# Patient Record
Sex: FEMALE | Race: WHITE | NOT HISPANIC OR LATINO | URBAN - METROPOLITAN AREA
[De-identification: names, ages, dates, MRNs, and addresses within clinical notes are randomized per-mention and may not be internally consistent; named-entity substitution may affect disease eponyms.]

---

## 2021-07-10 ENCOUNTER — EMERGENCY (EMERGENCY)
Facility: HOSPITAL | Age: 71
LOS: 1 days | Discharge: HOME | End: 2021-07-10
Attending: EMERGENCY MEDICINE
Payer: MEDICARE

## 2021-07-10 VITALS
DIASTOLIC BLOOD PRESSURE: 79 MMHG | RESPIRATION RATE: 16 BRPM | HEART RATE: 85 BPM | SYSTOLIC BLOOD PRESSURE: 118 MMHG | WEIGHT: 145.95 LBS | OXYGEN SATURATION: 97 % | TEMPERATURE: 97 F | HEIGHT: 61 IN

## 2021-07-10 VITALS
SYSTOLIC BLOOD PRESSURE: 137 MMHG | RESPIRATION RATE: 16 BRPM | DIASTOLIC BLOOD PRESSURE: 81 MMHG | OXYGEN SATURATION: 100 % | TEMPERATURE: 98 F | HEART RATE: 87 BPM

## 2021-07-10 DIAGNOSIS — Z90.49 ACQUIRED ABSENCE OF OTHER SPECIFIED PARTS OF DIGESTIVE TRACT: Chronic | ICD-10-CM

## 2021-07-10 PROCEDURE — 70450 CT HEAD/BRAIN W/O DYE: CPT | Mod: 26

## 2021-07-10 PROCEDURE — 76377 3D RENDER W/INTRP POSTPROCES: CPT

## 2021-07-10 PROCEDURE — 72125 CT NECK SPINE W/O DYE: CPT | Mod: 26

## 2021-07-10 PROCEDURE — 82962 GLUCOSE BLOOD TEST: CPT

## 2021-07-10 PROCEDURE — 73610 X-RAY EXAM OF ANKLE: CPT

## 2021-07-10 PROCEDURE — G1004: CPT

## 2021-07-10 PROCEDURE — 73200 CT UPPER EXTREMITY W/O DYE: CPT | Mod: 26,RT,MG

## 2021-07-10 PROCEDURE — 70450 CT HEAD/BRAIN W/O DYE: CPT

## 2021-07-10 PROCEDURE — 76377 3D RENDER W/INTRP POSTPROCES: CPT | Mod: 26

## 2021-07-10 PROCEDURE — 99285 EMERGENCY DEPT VISIT HI MDM: CPT

## 2021-07-10 PROCEDURE — 73610 X-RAY EXAM OF ANKLE: CPT | Mod: 26,LT

## 2021-07-10 PROCEDURE — 72125 CT NECK SPINE W/O DYE: CPT

## 2021-07-10 PROCEDURE — 73200 CT UPPER EXTREMITY W/O DYE: CPT

## 2021-07-10 PROCEDURE — 99284 EMERGENCY DEPT VISIT MOD MDM: CPT

## 2021-07-10 RX ORDER — ACETAMINOPHEN 500 MG
650 TABLET ORAL ONCE
Refills: 0 | Status: COMPLETED | OUTPATIENT
Start: 2021-07-10 | End: 2021-07-10

## 2021-07-10 RX ADMIN — Medication 650 MILLIGRAM(S): at 06:39

## 2021-07-10 NOTE — ED PROVIDER NOTE - PHYSICAL EXAMINATION
GEN: Patient awake alert NAD.   HEENT: mild tenderness to back of skull, no ecchymosis, laceration, hematomas, normocephalic, EOMI, no scleral icterus, moist MM  CARDIAC: RRR, S1, S2, no murmur.   PULM: CTA B/L no wheeze, rhonchi, rales.   ABD: soft NT, ND, no rebound no guarding, no CVA tenderness.   MSK: Tenderness to R upper back/ superior scapula, L ankle posterior tenderness, negative anterior draw, no pain with inversion/eversion, Moving all extremities, no edema. 5/5 strength and full ROM in all extremities.     NEURO: A&Ox3, gait normal, no focal neurological deficits, CN 2-12 grossly intact  SKIN: warm, dry, no rash, no ecchymosis

## 2021-07-10 NOTE — ED PROVIDER NOTE - PROGRESS NOTE DETAILS
Pt feeling better, imaging showed no acute pathology and pt is ok with d/c. Pt feeling better, imaging showed no acute pathology and pt is ok with d/c. Discussed fall prevention with pt and son. MD Santiago:  patient clinically well.  CTs, Xrays unremarkable.  Cause of fall more likely mechanical, than primary syncope.  dc home, return precautions.

## 2021-07-10 NOTE — ED ADULT NURSE NOTE - PMH
Asthma    Bronchitis    DM (diabetes mellitus)    Dyslipidemia    Frozen shoulder  Lt shoulder  History of appendectomy    HTN (hypertension)

## 2021-07-10 NOTE — ED PROVIDER NOTE - NSFOLLOWUPINSTRUCTIONS_ED_ALL_ED_FT
Please follow-up with primary care within a week. Please return to ER if symptoms worsen if you have worsening headaches, nausea, or vomiting, or symptoms of concern.       Fall Prevention    WHAT YOU NEED TO KNOW:    Fall prevention includes ways to make your home and other areas safer. It also includes ways you can move more carefully to prevent a fall. Health conditions that cause changes in your blood pressure, vision, or muscle strength and coordination may increase your risk for falls. Medicines may also increase your risk for falls if they make you dizzy, weak, or sleepy.    DISCHARGE INSTRUCTIONS:    Call 911 or have someone else call if:   •You have fallen and are unconscious.      •You have fallen and cannot move part of your body.      Contact your healthcare provider if:   •You have fallen and have pain or a headache.      •You have questions or concerns about your condition or care.      Fall prevention tips:   •Stand or sit up slowly. This may help you keep your balance and prevent falls.      •Use assistive devices as directed. Your healthcare provider may suggest that you use a cane or walker to help you keep your balance. You may need to have grab bars put in your bathroom near the toilet or in the shower.      •Wear shoes that fit well and have soles that . Wear shoes both inside and outside. Use slippers with good . Do not wear shoes with high heels.      •Wear a personal alarm. This is a device that allows you to call 911 if you fall and need help. Ask your healthcare provider for more information.      •Stay active. Exercise can help strengthen your muscles and improve your balance. Your healthcare provider may recommend water aerobics or walking. He or she may also recommend physical therapy to improve your coordination. Never start an exercise program without talking to your healthcare provider first.  Walking for Exercise           •Manage your medical conditions.  Keep all appointments with your healthcare providers. Visit your eye doctor as directed.      Home safety tips:     Fall Prevention for Adults     •Add items to prevent falls in the bathroom. Put nonslip strips on your bath or shower floor to prevent you from slipping. Use a bath mat if you do not have carpet in the bathroom. This will prevent you from falling when you step out of the bath or shower. Use a shower seat so you do not need to stand while you shower. Sit on the toilet or a chair in your bathroom to dry yourself and put on clothing. This will prevent you from losing your balance from drying or dressing yourself while you are standing.      •Keep paths clear. Remove books, shoes, and other objects from walkways and stairs. Place cords for telephones and lamps out of the way so that you do not need to walk over them. Tape them down if you cannot move them. Remove small rugs. If you cannot remove a rug, secure it with double-sided tape. This will prevent you from tripping.      •Install bright lights in your home. Use night lights to help light paths to the bathroom or kitchen. Always turn on the light before you start walking.      •Keep items you use often on shelves within reach. Do not use a step stool to help you reach an item.      •Paint or place reflective tape on the edges of your stairs. This will help you see the stairs better.      Follow up with your healthcare provider as directed: Write down your questions so you remember to ask them during your visits.

## 2021-07-10 NOTE — ED PROVIDER NOTE - PATIENT PORTAL LINK FT
You can access the FollowMyHealth Patient Portal offered by [facility] by registering at the following website: [email or web address]. By joining [facility]’s FollowMyHealth portal, you will also be able to view your health information using other applications (apps) compatible with our system.

## 2021-07-10 NOTE — ED PROVIDER NOTE - CLINICAL SUMMARY MEDICAL DECISION MAKING FREE TEXT BOX
71 F with PMH of DMII, HTN presents to the ED after fall from standing height not on AC c/o R shoulder pain and L ankle pain.  Pt was going to the bathroom early this morning when she lost her balance and fell into bathtub striking the back her head, R scapula and L ankle. 71 F with PMH of DMII, HTN presents to the ED after fall from standing height not on AC c/o R shoulder pain and L ankle pain.  Pt was going to the bathroom early this morning when she lost her balance and fell into bathtub striking the back her head, R scapula and L ankle. Will obtain R shoulder x-ray and Ct to r/o DL, humoral fx, scapular fx, head CT to r/o ICH, L ankle x-rays to r/o fx.

## 2021-07-10 NOTE — ED PROVIDER NOTE - OBJECTIVE STATEMENT
71 F with PMH of DMII, HTN presents to the ED after fall from standing height not on AC c/o R shoulder pain and L ankle pain.  Pt was going to the bathroom early this morning when she lost her balance and fell into bathtub striking the back her head, R scapula and L ankle. As per pt son, pt had LOC for 3-4 minutes. Pt initially had nausea but it has subsided. Pt has had a recent hx of frequent falls as per son. Pt denies CP, lightheadedness, numbness, tingling, vomiting, ABD pain. 71 F with PMH of DMII, HTN presents to the ED after fall from standing height not on AC c/o R shoulder pain and L ankle pain.  Pt was going to the bathroom early this morning when she lost her balance and fell into bathtub striking the back her head, R scapula and L ankle. As per pt son, pt had LOC for 3-4 minutes. Pt initially had nausea but it has subsided. Pt has had a recent hx of frequent falls as per son. Pt denies CP, lightheadedness, numbness, tingling, vomiting, ABD pain. Pt lives with son.

## 2021-07-10 NOTE — ED ADULT NURSE NOTE - OBJECTIVE STATEMENT
Pt is a 72 y/o female pmhx of HTN & T2DM presents to the ED BIBA from home s/p fall. Pt says she got up to go to the bathroom, lost her balance and fell hitting the back of her head. Denies LOC & AC use but states she felt confused & nauseous after the fall. Was unable to get off the floor herself, endorsing R shoulder pain. States she had an endoscopy yesterday and did not eat at all since before procedure, . Presents in c-spine collar. Pt presents alert & oriented x 4, calm, able to follow commands, speech clear, PERRL 2mm. Breathing spontaneous & nonlabored. Abdomen soft & nondistended. Strength 5/5 x 4 extremities, normally ambulates without assist. Strong peripheral pulses noted b/l, no edema noted. Skin warm, clean, dry & intact. Denies chest pain, SOB, abdominal pain, back pain, v/d, fever, chills, changes in vision. Call bell within reach, bed in lowest position, side rails up, wheels locked. Pt is a 72 y/o female pmhx of HTN & T2DM presents to the ED BIBA from home s/p fall. Pt says she got up to go to the bathroom, lost her balance and fell hitting the back of her head. Denies LOC & AC use but states she felt confused & nauseous after the fall. Was unable to get off the floor herself, endorsing R shoulder pain. States she had an endoscopy yesterday and did not eat at all since before procedure, . Presents in c-spine collar. Pt presents alert & oriented x 4, calm, able to follow commands, speech clear, PERRL 2mm. Breathing spontaneous & nonlabored. Abdomen soft & nondistended. Strength 5/5 x 4 extremities, normally ambulates without assist. Strong peripheral pulses noted b/l, mild edema noted to b/l LE. Skin warm, clean, dry & intact. Denies chest pain, SOB, abdominal pain, back pain, v/d, fever, chills, changes in vision. Call bell within reach, bed in lowest position, side rails up, wheels locked. Pt is a 72 y/o female pmhx of HTN & T2DM presents to the ED BIBA from home s/p fall. Pt says she got up to go to the bathroom, lost her balance and fell hitting the back of her head. Denies LOC & AC use but states she felt confused & nauseous after the fall. Was unable to get off the floor herself, endorsing R shoulder pain & L ankle pain. States she had an endoscopy yesterday and has not eaten since before procedure, . Presents in c-spine collar. Pt presents alert & oriented x 4, calm, able to follow commands, speech clear, PERRL 2mm. Breathing spontaneous & nonlabored. Abdomen soft & nondistended. Strength 5/5 x 4 extremities, normally ambulates without assist. Strong peripheral pulses noted b/l, mild edema noted to b/l LE. Skin warm, clean, dry & intact. Denies chest pain, SOB, abdominal pain, back pain, v/d, fever, chills, changes in vision. Call bell within reach, bed in lowest position, side rails up, wheels locked.

## 2021-07-10 NOTE — ED PROVIDER NOTE - NS ED ROS FT
GENERAL: No fever, chills  EYES: no vision changes, no discharge.   ENT: no difficulty swallowing or speaking   CARDIAC: no chest pain/pressure, SOB, lower extremity swelling  PULMONARY: no cough, SOB  GI: +nausea, no abdominal pain, v/d  : no dysuria, no hematuria  SKIN: no rashes, no ecchymosis  NEURO: no headache, lightheadedness  MSK: L ankle pain pain, myalgia, weakness. GENERAL: No fever, chills  EYES: no vision changes, no discharge.   ENT: no difficulty swallowing or speaking   CARDIAC: no chest pain/pressure, SOB, lower extremity swelling  PULMONARY: no cough, SOB  GI: +nausea, no abdominal pain, v/d  : no dysuria, no hematuria  SKIN: no rashes, no ecchymosis  NEURO: no headache, lightheadedness  MSK: L ankle pain pain, myalgia, weakness.    A 14 point review of systems is negative except as in HPI or otherwise documented.

## 2021-07-10 NOTE — ED ADULT NURSE NOTE - NSIMPLEMENTINTERV_GEN_ALL_ED
Implemented All Fall Risk Interventions:  [place] to call system. Call bell, personal items and telephone within reach. Instruct patient to call for assistance. Room bathroom lighting operational. Non-slip footwear when patient is off stretcher. Physically safe environment: no spills, clutter or unnecessary equipment. Stretcher in lowest position, wheels locked, appropriate side rails in place. Provide visual cue, wrist band, yellow gown, etc. Monitor gait and stability. Monitor for mental status changes and reorient to person, place, and time. Review medications for side effects contributing to fall risk. Reinforce activity limits and safety measures with patient and family.

## 2021-07-10 NOTE — ED PROVIDER NOTE - CARE PLAN
Principal Discharge DX:	Fall   Principal Discharge DX:	Closed head injury with brief loss of consciousness  Secondary Diagnosis:	Fall, initial encounter  Secondary Diagnosis:	Acute pain of right shoulder

## 2021-07-10 NOTE — ED PROVIDER NOTE - ATTENDING CONTRIBUTION TO CARE
Patient s/p fall secondary to loss of balance while going to the bathroom in the morning.  Struck head with brief LOC.  Also complaining of pain in R shoulder/scapula region and L ankle.  Not on blood thinners.  On exam neuro intact, point tender along R scapula with normal ROM, no skull hematoma/lacerations, no midline tenderness, no chest/abdomen/pelvis tenderness and stable pelvis, no deformity of L ankle.  Will obtain screening imaging for occult injury given age and re-evaluate.

## 2022-06-06 ENCOUNTER — EMERGENCY (EMERGENCY)
Facility: HOSPITAL | Age: 72
LOS: 1 days | Discharge: HOME | End: 2022-06-06
Attending: EMERGENCY MEDICINE | Admitting: EMERGENCY MEDICINE
Payer: MEDICARE

## 2022-06-06 VITALS
HEART RATE: 73 BPM | TEMPERATURE: 98 F | RESPIRATION RATE: 18 BRPM | DIASTOLIC BLOOD PRESSURE: 87 MMHG | OXYGEN SATURATION: 100 % | SYSTOLIC BLOOD PRESSURE: 188 MMHG | HEIGHT: 61 IN

## 2022-06-06 VITALS
HEART RATE: 89 BPM | TEMPERATURE: 98 F | RESPIRATION RATE: 18 BRPM | OXYGEN SATURATION: 100 % | DIASTOLIC BLOOD PRESSURE: 94 MMHG | SYSTOLIC BLOOD PRESSURE: 162 MMHG

## 2022-06-06 DIAGNOSIS — Z90.49 ACQUIRED ABSENCE OF OTHER SPECIFIED PARTS OF DIGESTIVE TRACT: Chronic | ICD-10-CM

## 2022-06-06 LAB
ALBUMIN SERPL ELPH-MCNC: 2.4 G/DL — LOW (ref 3.3–5)
ALP SERPL-CCNC: 66 U/L — SIGNIFICANT CHANGE UP (ref 40–120)
ALT FLD-CCNC: 13 U/L — SIGNIFICANT CHANGE UP (ref 4–33)
ANION GAP SERPL CALC-SCNC: 9 MMOL/L — SIGNIFICANT CHANGE UP (ref 7–14)
APPEARANCE UR: CLEAR — SIGNIFICANT CHANGE UP
AST SERPL-CCNC: 26 U/L — SIGNIFICANT CHANGE UP (ref 4–32)
BACTERIA # UR AUTO: ABNORMAL
BASOPHILS # BLD AUTO: 0.02 K/UL — SIGNIFICANT CHANGE UP (ref 0–0.2)
BASOPHILS NFR BLD AUTO: 0.4 % — SIGNIFICANT CHANGE UP (ref 0–2)
BILIRUB SERPL-MCNC: <0.2 MG/DL — SIGNIFICANT CHANGE UP (ref 0.2–1.2)
BILIRUB UR-MCNC: NEGATIVE — SIGNIFICANT CHANGE UP
BUN SERPL-MCNC: 25 MG/DL — HIGH (ref 7–23)
CALCIUM SERPL-MCNC: 8.9 MG/DL — SIGNIFICANT CHANGE UP (ref 8.4–10.5)
CHLORIDE SERPL-SCNC: 102 MMOL/L — SIGNIFICANT CHANGE UP (ref 98–107)
CO2 SERPL-SCNC: 22 MMOL/L — SIGNIFICANT CHANGE UP (ref 22–31)
COLOR SPEC: COLORLESS — SIGNIFICANT CHANGE UP
CREAT SERPL-MCNC: 1.85 MG/DL — HIGH (ref 0.5–1.3)
DIFF PNL FLD: ABNORMAL
EGFR: 28 ML/MIN/1.73M2 — LOW
EOSINOPHIL # BLD AUTO: 0.09 K/UL — SIGNIFICANT CHANGE UP (ref 0–0.5)
EOSINOPHIL NFR BLD AUTO: 1.9 % — SIGNIFICANT CHANGE UP (ref 0–6)
EPI CELLS # UR: 7 /HPF — HIGH (ref 0–5)
FLUAV AG NPH QL: SIGNIFICANT CHANGE UP
FLUBV AG NPH QL: SIGNIFICANT CHANGE UP
GLUCOSE SERPL-MCNC: 126 MG/DL — HIGH (ref 70–99)
GLUCOSE UR QL: NEGATIVE — SIGNIFICANT CHANGE UP
HCT VFR BLD CALC: 30.2 % — LOW (ref 34.5–45)
HGB BLD-MCNC: 10.2 G/DL — LOW (ref 11.5–15.5)
HYALINE CASTS # UR AUTO: 2 /LPF — SIGNIFICANT CHANGE UP (ref 0–7)
IANC: 2.63 K/UL — SIGNIFICANT CHANGE UP (ref 1.8–7.4)
IMM GRANULOCYTES NFR BLD AUTO: 1.1 % — SIGNIFICANT CHANGE UP (ref 0–1.5)
KETONES UR-MCNC: NEGATIVE — SIGNIFICANT CHANGE UP
LEUKOCYTE ESTERASE UR-ACNC: ABNORMAL
LYMPHOCYTES # BLD AUTO: 1.25 K/UL — SIGNIFICANT CHANGE UP (ref 1–3.3)
LYMPHOCYTES # BLD AUTO: 26.9 % — SIGNIFICANT CHANGE UP (ref 13–44)
MCHC RBC-ENTMCNC: 29.9 PG — SIGNIFICANT CHANGE UP (ref 27–34)
MCHC RBC-ENTMCNC: 33.8 GM/DL — SIGNIFICANT CHANGE UP (ref 32–36)
MCV RBC AUTO: 88.6 FL — SIGNIFICANT CHANGE UP (ref 80–100)
MONOCYTES # BLD AUTO: 0.61 K/UL — SIGNIFICANT CHANGE UP (ref 0–0.9)
MONOCYTES NFR BLD AUTO: 13.1 % — SIGNIFICANT CHANGE UP (ref 2–14)
NEUTROPHILS # BLD AUTO: 2.63 K/UL — SIGNIFICANT CHANGE UP (ref 1.8–7.4)
NEUTROPHILS NFR BLD AUTO: 56.6 % — SIGNIFICANT CHANGE UP (ref 43–77)
NITRITE UR-MCNC: NEGATIVE — SIGNIFICANT CHANGE UP
NRBC # BLD: 0 /100 WBCS — SIGNIFICANT CHANGE UP
NRBC # FLD: 0 K/UL — SIGNIFICANT CHANGE UP
NT-PROBNP SERPL-SCNC: 306 PG/ML — HIGH
PH UR: 7 — SIGNIFICANT CHANGE UP (ref 5–8)
PLATELET # BLD AUTO: 245 K/UL — SIGNIFICANT CHANGE UP (ref 150–400)
POTASSIUM SERPL-MCNC: 3.8 MMOL/L — SIGNIFICANT CHANGE UP (ref 3.5–5.3)
POTASSIUM SERPL-SCNC: 3.8 MMOL/L — SIGNIFICANT CHANGE UP (ref 3.5–5.3)
PROT SERPL-MCNC: 5.2 G/DL — LOW (ref 6–8.3)
PROT UR-MCNC: ABNORMAL
RBC # BLD: 3.41 M/UL — LOW (ref 3.8–5.2)
RBC # FLD: 12.8 % — SIGNIFICANT CHANGE UP (ref 10.3–14.5)
RBC CASTS # UR COMP ASSIST: SIGNIFICANT CHANGE UP /HPF (ref 0–4)
RSV RNA NPH QL NAA+NON-PROBE: SIGNIFICANT CHANGE UP
SARS-COV-2 RNA SPEC QL NAA+PROBE: SIGNIFICANT CHANGE UP
SODIUM SERPL-SCNC: 133 MMOL/L — LOW (ref 135–145)
SP GR SPEC: 1.01 — SIGNIFICANT CHANGE UP (ref 1–1.05)
TROPONIN T, HIGH SENSITIVITY RESULT: 32 NG/L — SIGNIFICANT CHANGE UP
TROPONIN T, HIGH SENSITIVITY RESULT: 33 NG/L — SIGNIFICANT CHANGE UP
UROBILINOGEN FLD QL: SIGNIFICANT CHANGE UP
WBC # BLD: 4.65 K/UL — SIGNIFICANT CHANGE UP (ref 3.8–10.5)
WBC # FLD AUTO: 4.65 K/UL — SIGNIFICANT CHANGE UP (ref 3.8–10.5)
WBC UR QL: SIGNIFICANT CHANGE UP /HPF (ref 0–5)

## 2022-06-06 PROCEDURE — 99236 HOSP IP/OBS SAME DATE HI 85: CPT | Mod: 25

## 2022-06-06 PROCEDURE — 93010 ELECTROCARDIOGRAM REPORT: CPT

## 2022-06-06 PROCEDURE — 71046 X-RAY EXAM CHEST 2 VIEWS: CPT | Mod: 26

## 2022-06-06 PROCEDURE — 99285 EMERGENCY DEPT VISIT HI MDM: CPT

## 2022-06-06 RX ORDER — SODIUM CHLORIDE 9 MG/ML
1000 INJECTION, SOLUTION INTRAVENOUS
Refills: 0 | Status: DISCONTINUED | OUTPATIENT
Start: 2022-06-06 | End: 2022-06-06

## 2022-06-06 RX ORDER — ISOPROPYL ALCOHOL, BENZOCAINE .7; .06 ML/ML; ML/ML
1 SWAB TOPICAL
Qty: 1 | Refills: 5
Start: 2022-06-06

## 2022-06-06 RX ADMIN — SODIUM CHLORIDE 100 MILLILITER(S): 9 INJECTION, SOLUTION INTRAVENOUS at 04:12

## 2022-06-06 NOTE — ED PROVIDER NOTE - PHYSICAL EXAMINATION
PHYSICAL EXAM:  GENERAL: Sitting comfortable in bed, in no acute distress  HENMT: Atraumatic, moist mucous membranes  EYES: Clear bilaterally, PERRL, EOMs intact b/l  HEART: RRR, nl S1/S2, no murmurs  RESPIRATORY: Clear to auscultation bilaterally, no wheezes/rhonchi/rales  ABDOMEN: +BS, soft, nontender, nondistended  EXTREMITIES: +1 pitting edema b/l, +2 radial pulses b/l  NEURO:  A&Ox4, no focal motor deficits or sensory deficits  SKIN:  Skin warm, dry and intact. No evidence of rash.

## 2022-06-06 NOTE — ED CDU PROVIDER DISPOSITION NOTE - CLINICAL COURSE
73 Y/O F PMH HTN, DM on Glimepiride 4, Metformin 500BID and Tradjenta 5Mg presents with fatigue. Pt states that at 10PM she felt lightheaded and sweaty and called EMS, FS 53 on EMS arrival. Pt endorses going on a long walk after 7PM. Does states she has been eating less in general after she states she suffered from covid and also states she suffered covid-19 vaccine side effects. Pt was seen in CDU, metformin stopped due to CKD, Glimeperide stopped as A1C is below 6 and pt is becoming hypoglycemic. Pt states she feels better, requests D/C. Will D/C with bg monitoring equipment, Endocrinology, PCP and Nephrology follow up. Pt states she is established with Dr. Delong.

## 2022-06-06 NOTE — ED ADULT NURSE NOTE - OBJECTIVE STATEMENT
Patient A&Ox4 ambulatory coming via EMS from home for dizziness and weakness today. Patient states FS was low when EMS checked it. Hx DM2 on oral medications only. Respirations even and unlabored. Patient states improvement after given apple juice in triage. Patient also endorsing new swelling to b/l ankles. Denies cp, sob, n/v/d, fevers and chills. Stretcher in lowest position, wheels locked, appropriate side rails in place, call bell in reach.

## 2022-06-06 NOTE — CONSULT NOTE ADULT - ASSESSMENT
or72 Y/O F PMH HTN, DM on Glimepiride 4, Metformin 500BID and Tradjenta 5Mg (per CDU note, pt does not know her meds off hand) presents with fatigue. Pt states that at 10PM she felt lightheaded and sweaty and called EMS, FS 53 on EMS arrival.  endocrine called for further assistance   a1c 5.8    1. DM  Please confirm DM meds with the pts pharmacy (as pt is unable to inform me of  her meds) however per CDU notes appears pt is on Glimepiride 4, Metformin 500BID and Tradjenta 5Mg   in the setting of decreased appetite and weight loss and a1c 5.8, which shows tightly controlled DM, would recommend that glimepiride be held for dc   would further recommend that metformin be held given decreased renal function and increased risk of lactic acidosis   continue trajenta at dc   i dw pt she needs to check FBSG at home given mediation changes and if the FSBG are to rise >200s consistently she will need to call her MD and start another DM agent   her a1c is 5.8, well controlled (do not have a prior to compare it too, nevertheless would avoid very tight control in the setting of known hypoglycemia currently)  pt is monitoring her diet more closely now and has decreased sugars and carbs intake and therefore will have less need for DM meds if diet is better controlled   please inform PCP of this change in meds/medical update and need for f/u outpt    monitor the pt and encourage PO intake (glucose was 70s this am).   FSBG premeals and bedtime and 3 am   given decreased kidney function she can be at risk for continued hypoglycemia in next 48-72hrs from the last dose of glimepiride   once glucoses are stable please do inform the pt to monitor FSBG premeals and bedtime and 3am for next two days if dc home to monitor for hypoglycemia      To prepare for dc:  - At home, Patient should check FSBG premeals and bedtime. Pt should call their doctor when FSBG <70 or above >400 and or consistently above 200s as changes in the regimen will have to be made.  -pt should call PMD for DM related questions or concerns  -Recommend annual podiatry and ophthalmology follow up.   Patient will also need a glucometer, test strips, lancets, alcohol pads, please review DM education and goals with the pt prior to dc and ensure pt has DM testing supplies     --------------------------------------------------------------------------------------------------------------------------------------      2. HTN  goal BP in DM <130/80  management as per the primary team  should establish outpt renal followup as well given what appears to be CKD?      3. HLD   outpt lipid panel  unclear if the pt is on statin as she does not know her current meds          Roxane Anderson MD  Attending Physician   Department of Endocrinology, Diabetes and Metabolism     Pager  [phone] [please provide 10 digit call back number]  SARAH 9-5  [email or web address]  Nights and weekends: [phone]  Please note that this patient may be followed by a different provider tomorrow.   If no answer or after hours, please contact [phone].  For final dc reccomendations, please call [phone] or page the endocrine fellow on call.

## 2022-06-06 NOTE — ED CDU PROVIDER INITIAL DAY NOTE - PROGRESS NOTE DETAILS
Bg has normalized, pt states she feels well and would like to go home now, will have the pt stop metformin and stop Glimepiride. Pt advised to F/U with her Nephrologist Dr. Delong.

## 2022-06-06 NOTE — ED ADULT NURSE NOTE - EXTENSIONS OF SELF_ADULT
None I have reviewed and confirmed nurses' notes for patient's medications, allergies, medical history, and surgical history.

## 2022-06-06 NOTE — ED PROVIDER NOTE - OBJECTIVE STATEMENT
71 y/o F PMH HTN, DM on oral meds BIBEMS for lightheadedness, dizziness and hypoglycemia. Pt reports around 10pm she began feeling lightheaded, dizzy with diaphoresis, she laid down to try to resolve these symptoms however did not improve thus family called EMS. On EMS arrival pt FS 53, given oral glucose and repeat was 70. She states lightheadedness improved but not completely after being given glucose, also notes she was feeling "off" earlier in the day after going on a long walk. Had eaten dinner at 7pm, no decreased PO intake recently. Also reports b/l LE swelling which she noticed today. She denies any fevers/chills, HA, neck pain, CP, SOB, cough, abd pain, n/v/d, dysuria/hematuria, focal numbness/weakness, or rashes. Pt notes she takes metformin, glimepiride and "another pill" for diabetes but unsure of the name, and that her PMD told her that her kidney function was decreased at her appointment last week.

## 2022-06-06 NOTE — ED CDU PROVIDER DISPOSITION NOTE - ATTENDING CONTRIBUTION TO CARE
agree with PA clinical course note  "71 Y/O F PMH HTN, DM on Glimepiride 4, Metformin 500BID and Tradjenta 5Mg presents with fatigue. Pt states that at 10PM she felt lightheaded and sweaty and called EMS, FS 53 on EMS arrival. Pt endorses going on a long walk after 7PM. Does states she has been eating less in general after she states she suffered from covid and also states she suffered covid-19 vaccine side effects. Pt was seen in CDU, metformin stopped due to CKD, Glimeperide stopped as A1C is below 6 and pt is becoming hypoglycemic. Pt states she feels better, requests D/C. Will D/C with bg monitoring equipment, Endocrinology, PCP and Nephrology follow up. Pt states she is established with Dr. Delong."    pt endorses multiple episodes of low sugar levels.  Has been seen by endocrinology whose recommendations are to stop the sulfonylurea.    pt has no complaints at this time and can follow up with PCP and endocrinology as well as nephrology.  Stable for discharge

## 2022-06-06 NOTE — ED CDU PROVIDER INITIAL DAY NOTE - MEDICAL DECISION MAKING DETAILS
73 Y/O F PMH HTN, DM on Glimepiride 4, Metformin 500BID and Tradjenta 5Mg presents with fatigue. Pt states that at 10PM she felt lightheaded and sweaty and called EMS, FS 53 on EMS arrival. Pt endorses going on a long walk after 7PM. Does states she has been eating less in general after she states she suffered from covid. Endocrinology consulted, plan to discuss Cr with pt's Nephrologist or PCP in the AM, Q4 hour finger sticks and IV hydration with dextrose ordered. Pt is well appearing, EKG is normal, denies any other sx/acute complaints.

## 2022-06-06 NOTE — ED PROVIDER NOTE - ATTENDING CONTRIBUTION TO CARE
Seen and examined, states hx of DM, takes oral hypoglycemic, never has had low sugars, today was feeling weak, went for a walk w no relief, felt worse, ate dinner at usual time (typically eats 2 meals per day, bkfast and dinner) and then several hrs. later felt worse, called family to take to ED. EMS found w low FS and pt. was given juice. States was feeling better by the time she came to ED, still mild weakness. Was told by PMD that kidney function had diminished recently. No recent illness, no fever/chills, no N/V/D, no urinary sx. Awake/alert at time of exam, clear lungs, heart reg, abd soft, NT to palp, no CVAT ,no edema, NT calves.

## 2022-06-06 NOTE — ED CDU PROVIDER INITIAL DAY NOTE - NS ED ATTENDING STATEMENT MOD
This was a shared visit with the CHEY. I reviewed and verified the documentation and independently performed the documented:

## 2022-06-06 NOTE — ED PROVIDER NOTE - NS ED ROS FT
Constitutional: no fever and no chills, (+) hypoglycemia, (+) lightheadedness, (+) diaphoresis  Eyes: no pain, no visual changes  ENMT: no ear pain, no dysphagia or throat pain  Neck: no pain, no stiffness  CV: no chest pain, no palpitations, (+) edema  Resp: no cough, no shortness of breath  Abd: no abdominal pain, no nausea or vomiting, no diarrhea  : no dysuria, no hematuria  MSK: no back pain, no neck pain, no joint pain  Neuro: no LOC, no gait abnormality, no headache, no sensory deficits, no weakness  Skin: no rashes, no lacerations

## 2022-06-06 NOTE — ED CDU PROVIDER INITIAL DAY NOTE - NSICDXPASTMEDICALHX_GEN_ALL_CORE_FT
PAST MEDICAL HISTORY:  Asthma     Bronchitis     DM (diabetes mellitus)     Dyslipidemia     Frozen shoulder Lt shoulder    History of appendectomy     HTN (hypertension)

## 2022-06-06 NOTE — ED PROVIDER NOTE - CLINICAL SUMMARY MEDICAL DECISION MAKING FREE TEXT BOX
73 y/o F PMH HTN, DM on oral meds BIBEMS for lightheadedness, dizziness and hypoglycemia. FS 53, given oral glucose and repeat was 70. On arrival in ED FS 54, given apple juice. No recent infectious symptoms, chest pain or SOB. LE swelling noticed today. Normal PO intake. Does note decreased kidney function at PMD last week. Hypoglycemia likely 2/2 sulfonylurea use in setting of decreased kidney function, less likely infectious or ischemic process. Will obtain CBC, BMP, trop/bnp, UA, CXR, EKG, repeat finger stick, reassess.

## 2022-06-06 NOTE — ED PROVIDER NOTE - PROGRESS NOTE DETAILS
[facility] EM/IM PGY4: Repeat FS 64, will place in CDU for continued blood glucose monitoring [facility] EM/IM PGY4: Repeat FS 64, will place in CDU for continued blood glucose monitoring. Asymptomatic with FS of 64.

## 2022-06-06 NOTE — ED ADULT TRIAGE NOTE - CHIEF COMPLAINT QUOTE
Pt BIBEMS for hypoglycemia. States she felt weak earlier today. FS 53 in field, given oral glucose, repeat fs 70 by ems. FS currently 54, given apple juice. Currently A&Ox4. Pmhx: DM, htn.

## 2022-06-06 NOTE — ED CDU PROVIDER INITIAL DAY NOTE - OBJECTIVE STATEMENT
73 Y/O F PMH HTN, DM on Glimepiride 4, Metformin 500BID and Tradjenta 5Mg presents with fatigue. Pt states that at 10PM she felt lightheaded and sweaty and called EMS, FS 53 on EMS arrival. Pt endorses going on a long walk after 7PM. Does states she has been eating less in general after she states she suffered from covid and also states she suffered covid-19 vaccine side effects. Pt states she follows with a Nephrologist (Dr Delong). Pt was advised of her elevated BP, states she was on a medication for HTN but does not recall the name and states it was recently stopped. Pt denies any other sx or acute complaints.

## 2022-06-06 NOTE — ED CDU PROVIDER DISPOSITION NOTE - NSFOLLOWUPINSTRUCTIONS_ED_ALL_ED_FT
STOP Metformin and STOP Glimepiride.  Continue Tradjenta and Atorvastatin. Your A1C is below 6 and you have poor kidney function and were having hypoglycemia (low blood sugar). Follow up with your Nephrologist Dr. Delong as soon as possible and see your Primary Care Doctor as well as an Endocrinologist. The ER will contact you to assist with Endocrinology follow up. You were sent medication to check your blood sugar. RETURN TO THE ER FOR LIGHTHEADEDNESS, NAUSEA, BLOOD SUGAR BELOW 80 OR ABOVE 250 OR FOR ANY OTHER SYMPTOMS THAT CONCERN YOU. Continue all previously prescribed medications as directed.  Follow up with your primary care physician in 48-72 hours- bring copies of your results.  Return to the ER for worsening or persistent symptoms, and/or ANY NEW OR CONCERNING SYMPTOMS. If you have issues obtaining follow up, please call: [phone] to obtain a doctor or specialist who takes your insurance in your area.  You may call [phone] to make an appointment with the internal medicine clinic.

## 2022-06-06 NOTE — ED CDU PROVIDER DISPOSITION NOTE - PATIENT PORTAL LINK FT
You can access the FollowMyHealth Patient Portal offered by [facility] by registering at the following website: [email or web address]. By joining [employer]’s FollowMyHealth portal, you will also be able to view your health information using other applications (apps) compatible with our system.

## 2022-06-06 NOTE — CONSULT NOTE ADULT - SUBJECTIVE AND OBJECTIVE BOX
Reason For Consult: hypoglycemia     HPI:      PAST MEDICAL & SURGICAL HISTORY:  Asthma      DM (diabetes mellitus)      Dyslipidemia      HTN (hypertension)      Bronchitis      Frozen shoulder  Lt shoulder      History of appendectomy      History of appendectomy          FAMILY HISTORY:  No pertinent family history in first degree relatives        Social History:    Outpatient Medications:    MEDICATIONS  (STANDING):  dextrose 5% + sodium chloride 0.45%. 1000 milliLiter(s) (100 mL/Hr) IV Continuous <Continuous>    MEDICATIONS  (PRN):      Allergies    No Known Allergies    Intolerances      Review of Systems:  Constitutional: No fever  Eyes: No blurry vision  Neuro: No tremors  HEENT: No pain  Cardiovascular: No chest pain, palpitations  Respiratory: No SOB, no cough  GI: No nausea, vomiting, abdominal pain  : No dysuria  Skin: no rash  Psych: no depression  Endocrine: no polyuria, polydipsia  Hem/lymph: no swelling  Osteoporosis: no fractures    ALL OTHER SYSTEMS REVIEWED AND NEGATIVE    UNABLE TO OBTAIN    PHYSICAL EXAM:  VITALS: T(C): 36.6 (06-06-22 @ 10:28)  T(F): 97.9 (06-06-22 @ 10:28), Max: 98.2 (06-06-22 @ 00:39)  HR: 79 (06-06-22 @ 10:28) (71 - 82)  BP: 139/75 (06-06-22 @ 10:28) (139/75 - 188/87)  RR:  (16 - 18)  SpO2:  (99% - 100%)  Wt(kg): --  GENERAL: NAD, well-groomed, well-developed  EYES: No proptosis, no lid lag, anicteric  HEENT:  Atraumatic, Normocephalic, moist mucous membranes  THYROID: Normal size, no palpable nodules  RESPIRATORY: Clear to auscultation bilaterally; No rales, rhonchi, wheezing, or rubs  CARDIOVASCULAR: Regular rate and rhythm; No murmurs; no peripheral edema  GI: Soft, nontender, non distended, normal bowel sounds  SKIN: Dry, intact, No rashes or lesions  MUSCULOSKELETAL: Full range of motion, normal strength  NEURO: sensation intact, extraocular movements intact, no tremor, normal reflexes  PSYCH: Alert and oriented x 3, normal affect, normal mood  CUSHING'S SIGNS: no striae    POCT Blood Glucose.: 93 mg/dL (06-06-22 @ 11:20)  POCT Blood Glucose.: 71 mg/dL (06-06-22 @ 07:32)  POCT Blood Glucose.: 96 mg/dL (06-06-22 @ 05:05)  POCT Blood Glucose.: 64 mg/dL (06-06-22 @ 03:34)  POCT Blood Glucose.: 105 mg/dL (06-06-22 @ 01:22)  POCT Blood Glucose.: 81 mg/dL (06-06-22 @ 00:54)  POCT Blood Glucose.: 54 mg/dL (06-06-22 @ 00:41)                            10.2   4.65  )-----------( 245      ( 06 Jun 2022 01:20 )             30.2       06-06    133<L>  |  102  |  25<H>  ----------------------------<  126<H>  3.8   |  22  |  1.85<H>    eGFR: 28<L>    Ca    8.9      06-06    TPro  5.2<L>  /  Alb  2.4<L>  /  TBili  <0.2  /  DBili  x   /  AST  26  /  ALT  13  /  AlkPhos  66  06-06      Thyroid Function Tests:              Radiology:                  Reason For Consult: hypoglycemia     HPI:  71 Y/O F PMH HTN, DM on Glimepiride 4, Metformin 500BID and Tradjenta 5Mg (per CDU note, pt does not know her meds off hand) presents with fatigue. Pt states that at 10PM she felt lightheaded and sweaty and called EMS, FS 53 on EMS arrival.    endocrine called for further assistance   pt has a long standing hx of DM  she follows with her PCP whom she saw last week  she states that since she contracted COVID earlier this year she has had decreased appetite, lost weight.  she states she does check her FSBG at home, she does not know the normal range for normal blood glucose.  she does not report a family hx of DM    she states she has been monitoring her carb intake and has cut down sugary foods.  her a1c here is 5.1, pt states in the past DM was not well controlled  labs also notable for elevated Cr.      PAST MEDICAL & SURGICAL HISTORY:  Asthma      DM (diabetes mellitus)      Dyslipidemia      HTN (hypertension)      Bronchitis      Frozen shoulder  Lt shoulder      History of appendectomy      History of appendectomy          FAMILY HISTORY:  No pertinent family history in first degree relatives        Social History:  lives with family   no illicits     Outpatient Medications:  Glimepiride 4, Metformin 500BID and Tradjenta 5Mg (per CDU note, pt does not know her meds off hand)     MEDICATIONS  (STANDING):  dextrose 5% + sodium chloride 0.45%. 1000 milliLiter(s) (100 mL/Hr) IV Continuous <Continuous>    MEDICATIONS  (PRN):      Allergies    No Known Allergies    Intolerances      Review of Systems:  Constitutional: No fever  Eyes: No blurry vision  Neuro: No tremors  HEENT: No pain  Cardiovascular: No chest pain, palpitations  Respiratory: No SOB, no cough  GI: No nausea, vomiting, abdominal pain  : No dysuria  Skin: no rash  Psych: no depression  Endocrine: no polyuria, polydipsia  Hem/lymph: no swelling  Osteoporosis: no fractures    ALL OTHER SYSTEMS REVIEWED AND NEGATIVE        PHYSICAL EXAM:  VITALS: T(C): 36.6 (06-06-22 @ 10:28)  T(F): 97.9 (06-06-22 @ 10:28), Max: 98.2 (06-06-22 @ 00:39)  HR: 79 (06-06-22 @ 10:28) (71 - 82)  BP: 139/75 (06-06-22 @ 10:28) (139/75 - 188/87)  RR:  (16 - 18)  SpO2:  (99% - 100%)  Wt(kg): --  GENERAL: NAD, well-groomed, well-developed  EYES: No proptosis, no lid lag, anicteric  HEENT:  Atraumatic, Normocephalic, moist mucous membranes  RESPIRATORY: Clear to auscultation bilaterally; No rales, rhonchi, wheezing, or rubs  CARDIOVASCULAR: Regular rate and rhythm; No murmurs; no peripheral edema  GI: Soft, nontender, non distended, normal bowel sounds  SKIN: Dry, intact, No rashes or lesions  MUSCULOSKELETAL: Full range of motion, normal strength  NEURO: sensation intact, extraocular movements intact, no tremor, normal reflexes  PSYCH: Alert and oriented x 3, normal affect, normal mood      POCT Blood Glucose.: 93 mg/dL (06-06-22 @ 11:20)  POCT Blood Glucose.: 71 mg/dL (06-06-22 @ 07:32)  POCT Blood Glucose.: 96 mg/dL (06-06-22 @ 05:05)  POCT Blood Glucose.: 64 mg/dL (06-06-22 @ 03:34)  POCT Blood Glucose.: 105 mg/dL (06-06-22 @ 01:22)  POCT Blood Glucose.: 81 mg/dL (06-06-22 @ 00:54)  POCT Blood Glucose.: 54 mg/dL (06-06-22 @ 00:41)                            10.2   4.65  )-----------( 245      ( 06 Jun 2022 01:20 )             30.2       06-06    133<L>  |  102  |  25<H>  ----------------------------<  126<H>  3.8   |  22  |  1.85<H>    eGFR: 28<L>    Ca    8.9      06-06    TPro  5.2<L>  /  Alb  2.4<L>  /  TBili  <0.2  /  DBili  x   /  AST  26  /  ALT  13  /  AlkPhos  66  06-06      Thyroid Function Tests:              Radiology:

## 2022-06-07 LAB
CULTURE RESULTS: SIGNIFICANT CHANGE UP
SPECIMEN SOURCE: SIGNIFICANT CHANGE UP

## 2022-06-28 ENCOUNTER — APPOINTMENT (OUTPATIENT)
Dept: ULTRASOUND IMAGING | Facility: CLINIC | Age: 72
End: 2022-06-28

## 2022-06-28 ENCOUNTER — OUTPATIENT (OUTPATIENT)
Dept: OUTPATIENT SERVICES | Facility: HOSPITAL | Age: 72
LOS: 1 days | End: 2022-06-28
Payer: MEDICARE

## 2022-06-28 DIAGNOSIS — Z90.49 ACQUIRED ABSENCE OF OTHER SPECIFIED PARTS OF DIGESTIVE TRACT: Chronic | ICD-10-CM

## 2022-06-28 DIAGNOSIS — Z00.8 ENCOUNTER FOR OTHER GENERAL EXAMINATION: ICD-10-CM

## 2022-06-28 PROCEDURE — 76770 US EXAM ABDO BACK WALL COMP: CPT | Mod: 26

## 2022-06-28 PROCEDURE — 76770 US EXAM ABDO BACK WALL COMP: CPT

## 2025-02-17 NOTE — ED ADULT NURSE NOTE - SUICIDE SCREENING DEPRESSION
Informed pt's daughter of Dr. Smith's note. Pt's daughter verbalized understanding with no further questions or concerns at this time.    Negative